# Patient Record
Sex: FEMALE | Race: WHITE | HISPANIC OR LATINO | Employment: FULL TIME | URBAN - METROPOLITAN AREA
[De-identification: names, ages, dates, MRNs, and addresses within clinical notes are randomized per-mention and may not be internally consistent; named-entity substitution may affect disease eponyms.]

---

## 2024-04-05 ENCOUNTER — TELEPHONE (OUTPATIENT)
Dept: NEUROLOGY | Facility: CLINIC | Age: 24
End: 2024-04-05
Payer: COMMERCIAL

## 2024-04-11 ENCOUNTER — LAB VISIT (OUTPATIENT)
Dept: LAB | Facility: HOSPITAL | Age: 24
End: 2024-04-11
Attending: NURSE PRACTITIONER
Payer: COMMERCIAL

## 2024-04-11 ENCOUNTER — OFFICE VISIT (OUTPATIENT)
Dept: NEUROLOGY | Facility: CLINIC | Age: 24
End: 2024-04-11
Payer: COMMERCIAL

## 2024-04-11 ENCOUNTER — TELEPHONE (OUTPATIENT)
Dept: NEUROLOGY | Facility: CLINIC | Age: 24
End: 2024-04-11
Payer: COMMERCIAL

## 2024-04-11 VITALS
SYSTOLIC BLOOD PRESSURE: 118 MMHG | RESPIRATION RATE: 16 BRPM | BODY MASS INDEX: 19.39 KG/M2 | HEART RATE: 96 BPM | HEIGHT: 64 IN | WEIGHT: 113.56 LBS | DIASTOLIC BLOOD PRESSURE: 88 MMHG

## 2024-04-11 DIAGNOSIS — R94.31 PROLONGED Q-T INTERVAL ON ECG: ICD-10-CM

## 2024-04-11 DIAGNOSIS — R56.9 CONVULSIONS, UNSPECIFIED CONVULSION TYPE: ICD-10-CM

## 2024-04-11 DIAGNOSIS — R56.9 SEIZURE-LIKE ACTIVITY: Primary | ICD-10-CM

## 2024-04-11 DIAGNOSIS — F41.9 ANXIETY: ICD-10-CM

## 2024-04-11 LAB
ALBUMIN SERPL BCP-MCNC: 4 G/DL (ref 3.5–5.2)
ALP SERPL-CCNC: 57 U/L (ref 55–135)
ALT SERPL W/O P-5'-P-CCNC: 11 U/L (ref 10–44)
ANION GAP SERPL CALC-SCNC: 9 MMOL/L (ref 8–16)
AST SERPL-CCNC: 17 U/L (ref 10–40)
BASOPHILS # BLD AUTO: 0.07 K/UL (ref 0–0.2)
BASOPHILS NFR BLD: 1.4 % (ref 0–1.9)
BILIRUB SERPL-MCNC: 0.3 MG/DL (ref 0.1–1)
BUN SERPL-MCNC: 12 MG/DL (ref 6–20)
CALCIUM SERPL-MCNC: 9.1 MG/DL (ref 8.7–10.5)
CHLORIDE SERPL-SCNC: 105 MMOL/L (ref 95–110)
CO2 SERPL-SCNC: 25 MMOL/L (ref 23–29)
CREAT SERPL-MCNC: 0.6 MG/DL (ref 0.5–1.4)
DIFFERENTIAL METHOD BLD: ABNORMAL
EOSINOPHIL # BLD AUTO: 0.1 K/UL (ref 0–0.5)
EOSINOPHIL NFR BLD: 2.2 % (ref 0–8)
ERYTHROCYTE [DISTWIDTH] IN BLOOD BY AUTOMATED COUNT: 11.7 % (ref 11.5–14.5)
EST. GFR  (NO RACE VARIABLE): >60 ML/MIN/1.73 M^2
GLUCOSE SERPL-MCNC: 91 MG/DL (ref 70–110)
HCG SERPL QL: NEGATIVE
HCT VFR BLD AUTO: 41 % (ref 37–48.5)
HGB BLD-MCNC: 13.9 G/DL (ref 12–16)
IMM GRANULOCYTES # BLD AUTO: 0.01 K/UL (ref 0–0.04)
IMM GRANULOCYTES NFR BLD AUTO: 0.2 % (ref 0–0.5)
LYMPHOCYTES # BLD AUTO: 1.8 K/UL (ref 1–4.8)
LYMPHOCYTES NFR BLD: 34.5 % (ref 18–48)
MCH RBC QN AUTO: 33.5 PG (ref 27–31)
MCHC RBC AUTO-ENTMCNC: 33.9 G/DL (ref 32–36)
MCV RBC AUTO: 99 FL (ref 82–98)
MONOCYTES # BLD AUTO: 0.5 K/UL (ref 0.3–1)
MONOCYTES NFR BLD: 10.4 % (ref 4–15)
NEUTROPHILS # BLD AUTO: 2.6 K/UL (ref 1.8–7.7)
NEUTROPHILS NFR BLD: 51.3 % (ref 38–73)
NRBC BLD-RTO: 0 /100 WBC
PLATELET # BLD AUTO: 213 K/UL (ref 150–450)
PMV BLD AUTO: 11.1 FL (ref 9.2–12.9)
POTASSIUM SERPL-SCNC: 3.9 MMOL/L (ref 3.5–5.1)
PROT SERPL-MCNC: 6.8 G/DL (ref 6–8.4)
RBC # BLD AUTO: 4.15 M/UL (ref 4–5.4)
SODIUM SERPL-SCNC: 139 MMOL/L (ref 136–145)
TSH SERPL DL<=0.005 MIU/L-ACNC: 3.05 UIU/ML (ref 0.4–4)
WBC # BLD AUTO: 5.1 K/UL (ref 3.9–12.7)

## 2024-04-11 PROCEDURE — 80053 COMPREHEN METABOLIC PANEL: CPT | Performed by: NURSE PRACTITIONER

## 2024-04-11 PROCEDURE — 3008F BODY MASS INDEX DOCD: CPT | Mod: CPTII,S$GLB,, | Performed by: NURSE PRACTITIONER

## 2024-04-11 PROCEDURE — 3074F SYST BP LT 130 MM HG: CPT | Mod: CPTII,S$GLB,, | Performed by: NURSE PRACTITIONER

## 2024-04-11 PROCEDURE — 85025 COMPLETE CBC W/AUTO DIFF WBC: CPT | Performed by: NURSE PRACTITIONER

## 2024-04-11 PROCEDURE — 99999 PR PBB SHADOW E&M-EST. PATIENT-LVL III: CPT | Mod: PBBFAC,,, | Performed by: NURSE PRACTITIONER

## 2024-04-11 PROCEDURE — 84443 ASSAY THYROID STIM HORMONE: CPT | Performed by: NURSE PRACTITIONER

## 2024-04-11 PROCEDURE — 36415 COLL VENOUS BLD VENIPUNCTURE: CPT | Mod: PO | Performed by: NURSE PRACTITIONER

## 2024-04-11 PROCEDURE — 84703 CHORIONIC GONADOTROPIN ASSAY: CPT | Mod: PO | Performed by: NURSE PRACTITIONER

## 2024-04-11 PROCEDURE — 3079F DIAST BP 80-89 MM HG: CPT | Mod: CPTII,S$GLB,, | Performed by: NURSE PRACTITIONER

## 2024-04-11 PROCEDURE — 99204 OFFICE O/P NEW MOD 45 MIN: CPT | Mod: S$GLB,,, | Performed by: NURSE PRACTITIONER

## 2024-04-11 NOTE — ASSESSMENT & PLAN NOTE
Etiology of episode unclear  Features suggestive of a functional NEE, but some features do raise concern for an epileptic event   No indication to start AED at this time.   Pt instructed to notify clinic ASAP with any recurrence   Will evaluate further with MRI brain w wo, serologies and baseline EEG  Seizure safety discussed with pt and SO present  Pt v/u of [place] state law pertaining to driving -- no driving for 6 months post event

## 2024-04-11 NOTE — PROGRESS NOTES
"NEUROLOGY  Outpatient Consultation Visit     [facility]  [address], [place] [address]  [phone] (office) / [phone] (fax)    Patient Name:  Jackie Lance  :  2000  MR #:  [id number]  Acct #:  [id number]    Date of  Visit: 2024    Other Physicians:  No, Primary Doctor (Primary Care Physician)      CHIEF COMPLAINT: Seizures (Hospital follow up )        HISTORY OF PRESENT ILLNESS:  Jackie Lance is a 24 y.o. R-handed female seen in consultation for seizure like activity per Self, Aaareferral    Medical history is significant for anxiety    Here today with her significant other, who assists with history.     Presented to the ED at [facility] 3/6 due to witnessed seizure like activity. Referred to neurology for further evaluation.     Works as a  at [employer] in [place]. Episode occurred while at work. The night prior, reports drinking more ETOH than typical for her. Woke up feeling badly, rushed to get to work and did not eat. At work, recalls feeling overwhelmed. Told her SO (who is a cowoker) that she couldn't think clearly. Recalls a sense of poncho vu, described as a "intense stress dream" involving seeing herself at work in a previous setting. Sat down in front of fan. Then, he witnessed her turn her head to the left and become verbally unresponsive. She kept turning to the left and fell to the ground. Eyes were open. She was tense and then started convulsing. Then, she would tense up and relax. Episode lasted about 2 minutes. Woke up confused, amnestic. She noticed some soreness in the L buccal mucosa and did have some bites there. No urinary incontinence.      Has not had any recurrence of symptoms since.     Does feel anxious and stressed often. Not on any treatment for anxiety. Has had panic attack once in the past while at work. Had same sense of poncho vu as described above prior to that episode, but no convulsions or LOC.     No known " "history of seizures. Denies epilepsy RF, including CNS infection, prior head trauma,  complications. Reports that her great grandmother had epilepsy.     Vapes nicotine, smokes marijuana daily. ETOH 3-4 nights out of the week. Denies any other substance use.   She is not on any oral or other contraceptive.     Allergies:  Review of patient's allergies indicates:  No Known Allergies    Current Medications:  No current outpatient medications on file.     No current facility-administered medications for this visit.       Past Medical History:  History reviewed. No pertinent past medical history.    Past Surgical History:  History reviewed. No pertinent surgical history.    Family History:  family history includes Dementia in her maternal grandmother and paternal grandfather.    Social History:   reports that she has been smoking vaping with nicotine. She has never used smokeless tobacco. She reports current alcohol use.      REVIEW OF SYSTEMS:  As per HPI    PHYSICAL EXAM:  /88 (BP Location: Left arm, Patient Position: Sitting, BP Method: Medium (Automatic))   Pulse 96   Resp 16   Ht 5' 4" (1.626 m)   Wt 51.5 kg (113 lb 8.6 oz)   LMP 2024   BMI 19.49 kg/m²     General: Well groomed. No acute distress.  Cardiovascular: Regular rate and rhythm.   Pulmonary: Normal effort and rate.   Musculoskeletal: No obvious joint deformities, moves all extremities well.  Extremities: No clubbing, cyanosis or edema.     Neurological exam:  Mental status: Awake and alert.  Oriented to person, place, time and situation. Recent and remote memory appear to be intact.  Fund of knowledge normal. Normal attention and concentration.   Speech/Language: Fluent and appropriate. No dysarthria or aphasia on conversation. Able to follow complex commands.   Cranial nerves (II-XII): Visual fields full. Pupils equal round and reactive to light, extraocular movements intact, no ptosis, no nystagmus. Facial sensation and " "symmetry intact bilaterally. Hearing grossly intact. Palate mobile and midline. Shoulder shrug normal bilaterally. Normal tongue protrusion.   Motor: 5 out of 5 strength throughout the upper and lower extremities bilaterally. Normal bulk and tone. No abnormal movements noted. No drift appreciated.   Sensation: Intact to light touch.  DTR: 1+ at the knees and biceps bilaterally.  Coordination: Finger-nose-finger testing intact bilaterally. No tremor.   Gait: Normal gait.    DIAGNOSTIC DATA:  I have personally reviewed provider notes, labs and imaging made available to me today.     Prior history gained from EMR review:  2/2019 - ED with syncope in the setting of dehydration, hypoK and onset of menses. Also noted to have prolonged QT interval on EKG.   3/2016: ED with hyperventilation, numbness to fingers and toes in the setting of anxiety    Imaging:  [facility] 3/6/24: OSH report only  FINDINGS: No intracranial hemorrhage or acute intracranial abnormality is identified.  Mucosal thickening ethmoid air cells.  Fluid level left maxillary sinus.  The calvarium is intact.       IMPRESSION: No acute intracranial findings.  Sinusitis.     Cardiac:  EKG 2/2019:  Sinus rhythm at 70 bpm  No stemi  Long qtc at 475  No wide qrs complex  No delta wave  No short NC     Labs:  No results found for: "WBC", "HGB", "HCT", "PLT", "MCV", "RDW"  Lab Results   Component Value Date     03/06/2024    K 4.6 03/06/2024    CO2 25 03/06/2024    BUN 11 03/06/2024    CREATININE 0.64 03/06/2024    CALCIUM 9.8 03/06/2024     Lab Results   Component Value Date    PROT 8.0 (H) 03/06/2024    ALBUMIN 4.9 (H) 03/06/2024    BILITOT 0.6 03/06/2024    AST 26 03/06/2024    ALKPHOS 66 03/06/2024    ALT 13 03/06/2024     No results found for: "INR", "PROTIME", "PTT"  No results found for: "CHOL", "HDL", "LDLCALC", "TRIG", "CHOLHDL"  No results found for: "LABA1C", "HGBA1C"   No results found for: "FIGASCYX87"  No results found for: "FOLATE"  No results found " "for: "TSH"      ASSESSMENT & PLAN:  Jackie Lance is a 24 y.o. R-handed female seen in consultation for seizure like activity.     Problem List Items Addressed This Visit          Neuro    Seizure-like activity - Primary    Current Assessment & Plan     Etiology of episode unclear  Features suggestive of a functional NEE, but some features do raise concern for an epileptic event   No indication to start AED at this time.   Pt instructed to notify clinic ASAP with any recurrence   Will evaluate further with MRI brain w wo, serologies and baseline EEG  Seizure safety discussed with pt and SO present  Pt v/u of [place] state law pertaining to driving -- no driving for 6 months post event             Psychiatric    Anxiety       Cardiac/Vascular    Prolonged Q-T interval on ECG     Other Visit Diagnoses       Convulsions, unspecified convulsion type                Follow up: 6 weeks, ok for VV    I spent a total of 45 minutes on the day of the visit.    This includes face to face time with the patient, as well as non-face to face time preparing for and completing the visit (review of prior diagnostic testing and clinical notes, obtaining or reviewing history, documenting clinical information in the EMR, independently interpreting and communicating results to the patient/family and coordinating ongoing care).       I appreciate the opportunity to participate in the care of this patient. Please feel free to contact me with any concerns or questions.       Fay Damico, ACN-AG  [facility]  [address] [place] [address]    "

## 2024-04-11 NOTE — TELEPHONE ENCOUNTER
----- Message from Rubia Wallace, Patient Care Assistant sent at 4/11/2024  8:02 AM CDT -----  Contact: Pt  Type: Needs Medical Advice    Who Called: Pt  Best Call Back Number: [phone]  Inquiry/Question: Pt is running 10 minutes late to appt. Please advise Thank you~

## 2024-04-11 NOTE — PATIENT INSTRUCTIONS
We will check some labs today, as well as obtain a brain MRI and an EEG (seizure test)   Will also repeat an EKG in light of the past abnormal one     Notify me if you have any recurrent symptoms immediately     During a seizure:  - Ensure the person is in a safe place, remove hard or sharp objects from the area  - Turn the person on their side  - Never force anything into their mouth  - Keep on eye on the time, if the jerking/shaking/tensing of the muscles lasts > 5 minutes, call 911.    After a seizure:  - Allow them to lie quietly, gently call them to see if they wake up  - If there is injury or if they are not waking up within 5 minutes, call 911    Safety:  - Take showers, not baths  - Take care when around bodies of water (swimming pools, lakes, etc) and wear protective gear. Do not swim alone  - Use equipment with automatic shutoff safety features  - Do not climb ladders or use heavy machinery until seizure-free for 6 months  - Use the back burners when cooking  - If you have children, change diapers on the floor and avoid holding them while taking stairs  - Do not drive until seizure-free for 6 months    For women:  - Take folic acid supplement daily (4 mg daily recommended)  - Know that birth control can affect your medication and your medication may make birth control less effective  - If you do become pregnant or are thinking of becoming pregnant, be sure to talk to me  - It is important to continue taking your seizure medication while pregnant and we need to monitor you much more closely during pregnancy    Triggers:  - Be sure to take you medication as scheduled without missed doses  - Be sure to get 8 hours of sleep each night  - Certain medications to avoid:   - Benadryl (diphenhydramine)   - Tramadol (Ultram)   - Certain antibiotics in the fluoroquinolone class (levaquin or cipro)   - Wellbutrin    - Chantix   - Alcohol   - Other illegal drugs or stimulant medications  - Herbal supplements to avoid  that can lower the seizure threshold:   - Asafoetida, Borage, Ephedra, Ergot, Eucalyptus, Evening primrose, Ginkgo biloba, Ginseng, Pennyroyal, Umair, Shankpushpi, Star anise, Star fruit, Wormwood, and Yohimbine    Follow up with me in about 6 weeks (one testing has been completed)    Please call our clinic at [phone] or send a message on the [employer] portal if there are any changes to the plan discussed today. For example, if you are not contacted for the requested tests, referral(s) within one week, if you are unable to receive the medications prescribed, or if you feel you need to change the treatment course for any reason.

## 2024-04-30 ENCOUNTER — HOSPITAL ENCOUNTER (OUTPATIENT)
Dept: RADIOLOGY | Facility: HOSPITAL | Age: 24
Discharge: HOME | End: 2024-04-30
Attending: NURSE PRACTITIONER
Payer: COMMERCIAL

## 2024-04-30 ENCOUNTER — CLINICAL SUPPORT (OUTPATIENT)
Dept: LAB | Facility: HOSPITAL | Age: 24
End: 2024-04-30
Attending: NURSE PRACTITIONER
Payer: COMMERCIAL

## 2024-04-30 DIAGNOSIS — R56.9 CONVULSIONS, UNSPECIFIED CONVULSION TYPE: ICD-10-CM

## 2024-04-30 DIAGNOSIS — R94.31 PROLONGED Q-T INTERVAL ON ECG: ICD-10-CM

## 2024-04-30 PROCEDURE — 25500020 PHARM REV CODE 255: Mod: PO | Performed by: NURSE PRACTITIONER

## 2024-04-30 PROCEDURE — 70553 MRI BRAIN STEM W/O & W/DYE: CPT | Mod: 26,,, | Performed by: RADIOLOGY

## 2024-04-30 PROCEDURE — 93010 ELECTROCARDIOGRAM REPORT: CPT | Mod: ,,, | Performed by: GENERAL PRACTICE

## 2024-04-30 PROCEDURE — 93005 ELECTROCARDIOGRAM TRACING: CPT | Mod: PO | Performed by: GENERAL PRACTICE

## 2024-04-30 PROCEDURE — A9585 GADOBUTROL INJECTION: HCPCS | Mod: PO | Performed by: NURSE PRACTITIONER

## 2024-04-30 PROCEDURE — 70553 MRI BRAIN STEM W/O & W/DYE: CPT | Mod: TC,PO

## 2024-04-30 RX ORDER — GADOBUTROL 604.72 MG/ML
5.5 INJECTION INTRAVENOUS
Status: COMPLETED | OUTPATIENT
Start: 2024-04-30 | End: 2024-04-30

## 2024-04-30 RX ADMIN — GADOBUTROL 5.5 ML: 604.72 INJECTION INTRAVENOUS at 04:04

## 2024-05-16 ENCOUNTER — TELEPHONE (OUTPATIENT)
Dept: NEUROLOGY | Facility: CLINIC | Age: 24
End: 2024-05-16
Payer: COMMERCIAL

## 2024-05-20 LAB
OHS QRS DURATION: 78 MS
OHS QTC CALCULATION: 404 MS